# Patient Record
Sex: MALE | Race: WHITE | NOT HISPANIC OR LATINO | URBAN - METROPOLITAN AREA
[De-identification: names, ages, dates, MRNs, and addresses within clinical notes are randomized per-mention and may not be internally consistent; named-entity substitution may affect disease eponyms.]

---
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
## 2021-05-31 VITALS — WEIGHT: 230 LBS | HEIGHT: 73 IN | BODY MASS INDEX: 30.48 KG/M2

## 2021-06-12 NOTE — PROGRESS NOTES
[facility] Well Child Check    ASSESSMENT & PLAN  Dino Puentes is a 16  y.o. 9  m.o. who has normal growth and normal development.    No concerns.  Recommended laboratory testing but patient declined.  Patient will be a senior [employer] high school and considering going into engineering.  Appears to have good memory insight and judgment.  Depression screening negative.        Return to clinic in 1 year for a Well Child Check or sooner as needed    IMMUNIZATIONS/LABS  Immunizations were reviewed and orders were placed as appropriate.    REFERRALS  Dental:  The patient has already established care with a dentist.  Other:  No additional referrals were made at this time.    ANTICIPATORY GUIDANCE  I have reviewed age appropriate anticipatory guidance.    HEALTH HISTORY  Do you have any concerns that you'd like to discuss today?: No concerns       No question data found.    Do you have any significant health concerns in your family history?: No  No family history on file.  Since your last visit, have there been any major changes in your family, such as a move, job change, separation, divorce, or death in the family?: No    Home  Who lives in your home?:  Mom dad, younger sister   Social History     Social History Narrative     No narrative on file     Do you have any trouble with sleep?:  No    Education  What school does your child attend?:  [employer]   What grade is your child in?:  12th  How does the patient perform in school (grades, behavior, attention, homework?: good student      Eating  Does patient eat regular meals including fruits and vegetables?:  No, working on it.   What is the patient drinking (cow's milk, water, soda, juice, sports drinks, energy drinks, etc)?: cow's milk- 1%  Does patient have concerns about body or appearance?:  No    Activities  Does the patient have friends?:  yes  Does the patient get at least one hour of physical activity per day?:  no  Does the patient have less than  2 hours of screen time per day (aside from homework)?:  yes  What does your child do for exercise?:  Nothing, at this time working on improving this   Does the patient have interest/participate in community activities/volunteers/school sports?:  no    MENTAL HEALTH SCREENING  No Data Recorded  No Data Recorded    VISION/HEARING  Vision: Patient is already followed by a vision specialist  Hearing:  Completed. See Results    No exam data present    TB Risk Assessment:  The patient and/or parent/guardian answer positive to:  patient and/or parent/guardian answer 'no' to all screening TB questions    Dental  Is your child being seen by a dentist?  Yes  [employer] Application Screening  Is child seen by dentist?     Yes    Patient Active Problem List   Diagnosis     Acne       Drugs  Does the patient use tobacco/alcohol/drugs?:  no    Safety  Does the patient have any safety concerns (peer or home)?:  no  Does the patient use safety belts, helmets and other safety equipment?:  yes    Sex  Is the patient sexually active?:  no    MEASUREMENTS  Height:     Weight:    BMI: There is no height or weight on file to calculate BMI.  Blood Pressure:    No blood pressure reading on file for this encounter.    PHYSICAL EXAM  Physical Exam   Constitutional:    --Vitals as above  --No acute distress  Eyes-  --Sclera noninjected  --Lids and conjunctiva normal  ENT-  --TMs clear  --Sclera noninjected  --Pharynx not erythematous  Neck-  --Neck supple with no cervical lymphadenopathy  Lungs-  --Clear to Auscultation  Heart-  --Regular rate and rhythm  Abdomen--  --Soft, non-tender, non-distended  Skin-  --Pink and dry  Psych-  --Alert and oriented  --Normal affect  -  --testicles normal size shape consistency and no evidence of inguinal hernia.